# Patient Record
(demographics unavailable — no encounter records)

---

## 2024-12-24 NOTE — PHYSICAL EXAM
[Chaperone Present] : A chaperone was present in the examining room during all aspects of the physical examination [[id number]] : A chaperone was present during the pelvic exam. [FreeTextEntry2] : Fabiana [Appropriately responsive] : appropriately responsive

## 2024-12-24 NOTE — COUNSELING
[Contraception/ Emergency Contraception/ Safe Sexual Practices] : contraception, emergency contraception, safe sexual practices [Preconception Care/ Fertility options] : preconception care, fertility options [Pregnancy Options] : pregnancy options [Lab Results] : lab results [Medication Management] : medication management [Pre/Post Op Instructions] : pre/post op instructions

## 2024-12-24 NOTE — HISTORY OF PRESENT ILLNESS
[FreeTextEntry1] : 38  w/ IVF pregnancy for new pregnancy. Pregnancy dated by IVF transfer, with genetically tested embryo.  obhx: , vanishing twin pregnancy, GDMA1, h/o cervical shortening with cerclage placed by Dr Rose at 23 weeks GA. Reports cerclage was removed at 38 weeks and she was induced at 39 weeks.  7-0 lbs.  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  H/o LEEP x2, when she was 18 yo and then again in . Reports most recent pap smear from 2024 that was normal per patient.

## 2024-12-24 NOTE — PROCEDURE
[Transvaginal OB Sonogram] : Transvaginal OB Sonogram [Intrauterine Pregnancy] : intrauterine pregnancy [Yolk Sac] : yolk sac present [Fetal Heart] : fetal heart present [Date: ___] : Date: [unfilled] [Current GA by Sonogram: ___ (wks)] : Current GA by Sonogram: [unfilled]Uwks [Transvaginal OB Sonogram WNL] : Transvaginal OB Sonogram WNL [FreeTextEntry1] : CL: 3.3cm

## 2024-12-24 NOTE — DISCUSSION/SUMMARY
[FreeTextEntry1] : 37 yo , h/o cerclage and GDM, IVF pregnancy, BRISEYDA 2025 - will schedule cerclage placement, ideally 2025 - PNL and NIPT drawn today - referral given for NT and anatomy scan and MFM consultation - f/u 4 weeks.

## 2024-12-30 NOTE — HISTORY OF PRESENT ILLNESS
[FreeTextEntry1] : Diamante PALACIOS 89. 24 MFM Att'g f/u Consultation Note:  Ms Palacios is referred by Dr Madera.   38y  at 13w2d (audrey  by IVT-ET).  Pregnancy is c/b advanced maternal age, history of cervical shortening requiring cerclage and Hx GDMA1.  She is feeling well, and denies LAP, VB, LOF.  PMHx: 	Severe Dysplasia.  Hx IVF ETx2.   GDMA1, No DM  outside of preg.  No Chr Htn, No Asthma.   		 Sur LEEP for severe dysplasia 	 LEEP for severe dysplasia, 1cm depth.  Egg retrieval x2.   Cerclage for cervical shortening at 23w. Meds: 	PNV   	ASA 81mg.  Allergies: NKDA OB Hx: 	, SAb x2.  Per CHITO, "several miscarriages and trisomy" : IOL at 39w,  male 7#0, [facility].  Cerclage at 23w, removed at 37w.  GynHx:  Diminished ovarian reserve.  FHx:  Chr Htn M, F, Br.  Denies DM, Asthma.   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 Lives with  and 2yo.  Denies subst use x3.  Vaccinations: Not discussed today.  Family Planning: Per Dr Madera  ROS: As above.     Px: Pleasant woman in NAD.  Moves easily.  VS:   118/82, 74, 145#  5'04" BMI 25.  HEENT: NC/AT Lungs: Clear to Ausc bilat.  Cor: S1S2nl, no mrb, rrr Abd: Abd soft, NT, no masses. Extr: No Clubbing or cyanosis or edema.  SVE Not performed.   LAB:  Opos/Antib Neg;  HbEP AA.  PGT done. NIPS pending.    7k>14/39%<277k; A1C 5.2; RPR/HIV/HBSAg/HCVAb  } all normal.           	  MFMUS:  24: SIUP at 13w2d, post plac, CRL 13w3d.  NT 1.5mm.  Impression:  1.	Hx Cervical shortening at 23w requiring cerclage followed by bed rest for remainder of pregnancy, Hx LEEP x2,  and  (not disclosed by pt), possibly contributing to cervical insufficiency. ->Given past hx of LEEP x2 and cervical shortening in previous preg requiring rescue cerclage, repeat cerclage, prophylactic, as you are doing is strongly recommended. MFM is available for assistance as needed.  2.	Activity post cerclage:  Instructions to patient are generally a matter of performing MD preference.  We allow our patients to continue working, if they want to, and perform routine activities.  Ms Palacios understands to come to hospital immediately should she feel unwell, experience LAP, bleeding or LOF at any time, especially after the cerclage is placed. 3.	Hx GDMA1.  Many practitioners recommend early GCT in this setting.  Reasonable options with normal A1C also include office fingersticks and/or testing at the usual GA.  We do not recommend proceeding with 100g GTT without check 50gGCT first.  4.	IVF-ET pregnancy: Fetal ECHO at 20-21w, not yet ordered.  We offer F/U with preliminary anatomic survey at 16w is for maternal age and IVF-ET, at discretion of referring MD.  We have not yet scheduled this.  Some practitioners schedule CL 1-2w after cerclage placement.  We have not scheduled this.  RT at 20w for Comprehensive anatomic survey, CL and MFM Consultation MD Aparna< FACOG

## 2025-02-20 NOTE — PAST MEDICAL HISTORY
[HIV Infection] : no HIV [Exposure To Gonorrhea] : no gonorrhea [Chlamydial Infections] : no chlamydia [Syphilis] : no syphilis [Herpes Simplex] : no genital herpes [Hepatitis, B Virus] : no Hepatitis B [Hepatitis, C Virus] : no Hepatitis C [Trichomoniasis] : no trichomoniasis

## 2025-02-20 NOTE — DISCUSSION/SUMMARY
[FreeTextEntry1] : 38-year-old  3 para 1 0 1 1 BRISEYDA 25 had a normal anatomy scan today.  The cervix measured 3.56 cm. in length and the cervical suture appears to be well placed. I met with Ms. Palacios and the father of the baby. She feels well and the baby is active. I addressed their concerns. Echocardiogram was ordered. GCT scheduled for 24 weeks. HbA1c 5.2 % at the end of 2024. She is doing well.

## 2025-03-21 NOTE — ASSESSMENT
[FreeTextEntry1] : 38 years old female now 24 weeks 6 days with AMA and IVF pregnancy and normal fetal echo